# Patient Record
Sex: FEMALE | Race: OTHER | Employment: OTHER | URBAN - METROPOLITAN AREA
[De-identification: names, ages, dates, MRNs, and addresses within clinical notes are randomized per-mention and may not be internally consistent; named-entity substitution may affect disease eponyms.]

---

## 2018-01-25 NOTE — PATIENT DISCUSSION
(H26.709) Other secondary cataract, right eye - Assesment : Posterior capsule opacification present. Patient is symptomatic with visual function affected. - Plan : Discussed that opacity is the cause of the decreased vision. Discussed the risks and benefits of performing a YAG Capsulotomy including the risk of floaters, retinal detachment, and retinal swelling. Patient understands to expect floaters after the YAG capsulotomy procedures and understands that they may remain indefinitely. Recommend that patient undergo a YAG Capsulotomy OD (Right Eye).

## 2018-02-26 NOTE — PATIENT DISCUSSION
(A98.277) Other secondary cataract, left eye - Assesment : Significant posterior capsule opacification present OS. - Plan : Discussed that opacity is the cause of the decreased vision. Discussed the risks and benefits of performing a YAG Capsulotomy including the risk of floaters, retinal detachment, and retinal swelling. Patient understands to expect floaters after the YAG capsulotomy procedures and understands that they may remain indefinitely. Recommend that patient undergo a YAG Capsulotomy OS (Left Eye).

## 2018-02-26 NOTE — PATIENT DISCUSSION
(H26.466) Other secondary cataract, right eye - Assesment : Examination reveals PCO s/p YAG Cap OD. - Plan : Monitor for changes.

## 2021-11-18 ENCOUNTER — ESTABLISHED COMPREHENSIVE EXAM (OUTPATIENT)
Dept: URBAN - METROPOLITAN AREA CLINIC 42 | Facility: CLINIC | Age: 66
End: 2021-11-18

## 2021-11-18 DIAGNOSIS — Z01.00: ICD-10-CM

## 2021-11-18 DIAGNOSIS — H52.13: ICD-10-CM

## 2021-11-18 DIAGNOSIS — H52.4: ICD-10-CM

## 2021-11-18 DIAGNOSIS — H52.223: ICD-10-CM

## 2021-11-18 PROCEDURE — 92015 DETERMINE REFRACTIVE STATE: CPT

## 2021-11-18 PROCEDURE — 92014 COMPRE OPH EXAM EST PT 1/>: CPT

## 2021-11-18 ASSESSMENT — VISUAL ACUITY
OD_SC: 20/50
OU_SC: 20/40+1
OS_SC: 20/40-2
OS_SC: 20/30
OU_SC: 20/40-2
OD_SC: 20/80

## 2021-11-18 ASSESSMENT — KERATOMETRY
OD_AXISANGLE_DEGREES: 154
OS_AXISANGLE2_DEGREES: 114
OS_AXISANGLE2_DEGREES: 122
OS_K1POWER_DIOPTERS: 42.25
OS_K2POWER_DIOPTERS: 43.50
OD_AXISANGLE2_DEGREES: 064
OD_K2POWER_DIOPTERS: 43.50
OS_K2POWER_DIOPTERS: 43.25
OD_K1POWER_DIOPTERS: 42.50
OS_AXISANGLE_DEGREES: 24
OS_AXISANGLE_DEGREES: 32
OD_K2POWER_DIOPTERS: 43.25
OS_K1POWER_DIOPTERS: 42.50
OD_K1POWER_DIOPTERS: 42.75

## 2021-11-18 ASSESSMENT — TONOMETRY
OD_IOP_MMHG: 07
OS_IOP_MMHG: 08

## 2022-12-01 ENCOUNTER — PREPPED CHART (OUTPATIENT)
Dept: URBAN - METROPOLITAN AREA CLINIC 42 | Facility: CLINIC | Age: 67
End: 2022-12-01

## 2022-12-01 ASSESSMENT — KERATOMETRY
OD_AXISANGLE_DEGREES: 154
OS_AXISANGLE_DEGREES: 32
OD_K1POWER_DIOPTERS: 42.50
OS_K2POWER_DIOPTERS: 43.50
OD_K2POWER_DIOPTERS: 43.25
OS_AXISANGLE2_DEGREES: 114
OS_K2POWER_DIOPTERS: 43.25
OD_K1POWER_DIOPTERS: 42.75
OD_AXISANGLE2_DEGREES: 064
OD_K2POWER_DIOPTERS: 43.50
OS_K1POWER_DIOPTERS: 42.50
OS_AXISANGLE2_DEGREES: 122
OS_K1POWER_DIOPTERS: 42.25
OS_AXISANGLE_DEGREES: 24

## 2024-07-19 ENCOUNTER — COMPREHENSIVE EXAM (OUTPATIENT)
Dept: URBAN - METROPOLITAN AREA CLINIC 42 | Facility: CLINIC | Age: 69
End: 2024-07-19

## 2024-07-19 DIAGNOSIS — H52.13: ICD-10-CM

## 2024-07-19 DIAGNOSIS — Z96.1: ICD-10-CM

## 2024-07-19 DIAGNOSIS — Z01.00: ICD-10-CM

## 2024-07-19 DIAGNOSIS — H52.223: ICD-10-CM

## 2024-07-19 DIAGNOSIS — H52.4: ICD-10-CM

## 2024-07-19 PROCEDURE — 92014 COMPRE OPH EXAM EST PT 1/>: CPT

## 2024-07-19 PROCEDURE — 92015 DETERMINE REFRACTIVE STATE: CPT

## 2024-07-19 ASSESSMENT — KERATOMETRY
OS_AXISANGLE2_DEGREES: 114
OD_AXISANGLE2_DEGREES: 064
OD_K2POWER_DIOPTERS: 43.25
OD_AXISANGLE_DEGREES: 154
OS_K2POWER_DIOPTERS: 43.25
OS_AXISANGLE2_DEGREES: 30
OS_K1POWER_DIOPTERS: 42.50
OS_K1POWER_DIOPTERS: 43.25
OS_AXISANGLE2_DEGREES: 122
OD_AXISANGLE_DEGREES: 68
OD_K2POWER_DIOPTERS: 42.75
OS_AXISANGLE_DEGREES: 120
OD_K1POWER_DIOPTERS: 42.50
OS_K2POWER_DIOPTERS: 42.50
OS_AXISANGLE_DEGREES: 32
OD_K1POWER_DIOPTERS: 42.75
OD_AXISANGLE2_DEGREES: 158
OS_AXISANGLE_DEGREES: 24
OS_K2POWER_DIOPTERS: 43.50
OD_K2POWER_DIOPTERS: 43.50
OD_K1POWER_DIOPTERS: 43.50
OS_K1POWER_DIOPTERS: 42.25

## 2024-07-19 ASSESSMENT — VISUAL ACUITY
OS_SC: 20/40
OU_SC: 20/30
OS_SC: 20/30
OU_SC: 20/40
OD_SC: 20/30
OD_SC: 20/40

## 2024-07-19 ASSESSMENT — TONOMETRY
OD_IOP_MMHG: 11
OS_IOP_MMHG: 13

## 2025-07-22 ENCOUNTER — COMPREHENSIVE EXAM (OUTPATIENT)
Age: 70
End: 2025-07-22

## 2025-07-22 DIAGNOSIS — H43.22: ICD-10-CM

## 2025-07-22 DIAGNOSIS — H04.123: ICD-10-CM

## 2025-07-22 DIAGNOSIS — H52.13: ICD-10-CM

## 2025-07-22 PROCEDURE — 92014 COMPRE OPH EXAM EST PT 1/>: CPT

## 2025-07-22 PROCEDURE — 92015 DETERMINE REFRACTIVE STATE: CPT
